# Patient Record
Sex: MALE | Race: BLACK OR AFRICAN AMERICAN | NOT HISPANIC OR LATINO | Employment: STUDENT | URBAN - METROPOLITAN AREA
[De-identification: names, ages, dates, MRNs, and addresses within clinical notes are randomized per-mention and may not be internally consistent; named-entity substitution may affect disease eponyms.]

---
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
## 2025-05-20 NOTE — PATIENT INSTRUCTIONS
Eddi Graf MD  Pediatric Cardiology  [address] [place] [address]  [phone]    Name: Jalil River                   : 2005    Diagnosis:   1. Essential hypertension    2. 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 Hypercholesteremia        Orders placed this encounter  No orders of the defined types were placed in this encounter.      NEXT APPOINTMENT  Follow up in about 2 weeks (around 2019) for follow-up appointment, no studies needed, /labs today.    Special Testing Instructions: None.    Follow up with the primary care provider for the following issues: Nothing identified.             Plan:  1. Activity:No activity restrictions are indicated at this time. Activities may include endurance training, interscholastic athletic, competition and contact sports. No maximum weight lifting. No grunting when lifting weights.    2. The patient should see a dentist every 6 months for routine dental care.    No spontaneous bacterial endocarditis prophylaxis is required.    3. If anesthesia is needed for surgery, no special precautions from a cardiovascular standpoint are necessary.    Other recommendations:           General Guidelines    PCP: SUYAPA Chambers  PCP Phone Number: [phone]    · If you have an emergency or you think you have an emergency, go to the nearest emergency room!     · Breathing too fast, doesnt look right, consistently not eating well, your child needs to be checked. These are general indications that your child is not feeling well. This may be caused by anything, a stomach virus, an ear ache or heart disease, so please call SUYAPA Chambers. If SUYAPA Chambers thinks you need to be checked for your heart, they will let us know.     · If your child experiences a rapid or very slow heart rate and has the following symptoms, call SUYAPA Chambers or go to the nearest emergency room.   · unexplained chest pain   · does not look  right   · feels like they are going to pass out   · actually passes out for unexplained reasons   · weakness or fatigue   · shortness of breath  or breathing fast   · consistent poor feeding     · If your child experiences a rapid or very slow heart rate that lasts longer than 30 minutes call SUYAPA Chambers or go to the nearest emergency room.     · If your child feels like they are going to pass out - have them sit down or lay down immediately. Raise the feet above the head (prop the feet on a chair or the wall) until the feeling passes. Slowly allow the child to sit, then stand. If the feeling returns, lay back down and start over.              It is very important that you notify SUYAPA Chambers first. SUYAPA Chambers or the ER Physician can reach Dr. Graf at the office or through [facility] at [phone] as needed.         intubated